# Patient Record
Sex: MALE | Race: AMERICAN INDIAN OR ALASKA NATIVE | NOT HISPANIC OR LATINO | Employment: FULL TIME | URBAN - METROPOLITAN AREA
[De-identification: names, ages, dates, MRNs, and addresses within clinical notes are randomized per-mention and may not be internally consistent; named-entity substitution may affect disease eponyms.]

---

## 2022-04-30 ENCOUNTER — OFFICE VISIT (OUTPATIENT)
Dept: URGENT CARE | Facility: PHYSICIAN GROUP | Age: 37
End: 2022-04-30
Payer: COMMERCIAL

## 2022-04-30 VITALS
RESPIRATION RATE: 14 BRPM | HEIGHT: 73 IN | DIASTOLIC BLOOD PRESSURE: 80 MMHG | TEMPERATURE: 98.4 F | OXYGEN SATURATION: 98 % | BODY MASS INDEX: 38.36 KG/M2 | WEIGHT: 289.4 LBS | HEART RATE: 90 BPM | SYSTOLIC BLOOD PRESSURE: 130 MMHG

## 2022-04-30 DIAGNOSIS — U07.1 COVID-19: ICD-10-CM

## 2022-04-30 DIAGNOSIS — R50.9 FEVER, UNSPECIFIED FEVER CAUSE: ICD-10-CM

## 2022-04-30 DIAGNOSIS — Z20.822 SUSPECTED COVID-19 VIRUS INFECTION: ICD-10-CM

## 2022-04-30 DIAGNOSIS — J02.9 SORE THROAT: ICD-10-CM

## 2022-04-30 DIAGNOSIS — R05.9 COUGH: ICD-10-CM

## 2022-04-30 PROCEDURE — 99203 OFFICE O/P NEW LOW 30 MIN: CPT | Mod: CS | Performed by: PHYSICIAN ASSISTANT

## 2022-04-30 NOTE — PROGRESS NOTES
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 At home test is positive.)     Symptoms started Wednesday with sore throat and congestion.  Thursday night fever and chills.  Sore throat increasing and myalgias.  At home test for covid positive this a.m. positive COVID contacts at home.  Mild fatigue.  No SOB.  Taking tylenol.  Recently diagnosed with DM and HTN.  Has been able to lose 30 pounds since and BP improving.  Is interested in antiviral therapy.    Medications:  hydrocodone-acetaminophen Tabs  ibuprofen Tabs    Allergies:             Penicillins    Surgical History:       No past surgical history on file.    Past Social Hx:  Gen Nicole  reports that he has quit smoking. His smoking use included cigarettes. He quit after 12.00 years of use. He has never used smokeless tobacco. He reports current alcohol use. He reports that he does not use drugs.     Past Family Hx:   Gen Nicole family history includes Diabetes in his father and mother; Hypertension in his mother; Stroke in his mother.       Problem list, medications, and allergies reviewed by myself today in Epic.     Objective:     /80 (BP Location: Left arm, Patient Position: Sitting)   Pulse 90   Temp 36.9 °C (98.4 °F)   Resp 14   Ht 1.854 m (6' 1\")   Wt (!) 131 kg (289 lb 6.4 oz)   SpO2 98%   BMI 38.18 kg/m²     Physical Exam  Vitals and nursing note reviewed.   Constitutional:       General: He is not in acute distress.     Appearance: Normal appearance.   HENT:      Head: Normocephalic.      Jaw: No trismus.      Right Ear: Tympanic membrane is not erythematous. Tympanic membrane has decreased mobility.      Left Ear: Tympanic membrane is not erythematous. Tympanic membrane has decreased mobility.      Nose: Congestion and rhinorrhea present.      Mouth/Throat:      Mouth: Mucous membranes are moist.      Palate: No lesions.      " Pharynx: Uvula midline. Posterior oropharyngeal erythema present. No oropharyngeal exudate.      Tonsils: No tonsillar exudate.   Eyes:      Extraocular Movements: Extraocular movements intact.   Cardiovascular:      Rate and Rhythm: Normal rate and regular rhythm.      Pulses: Normal pulses.      Heart sounds: Normal heart sounds.   Pulmonary:      Effort: Pulmonary effort is normal. No accessory muscle usage or respiratory distress.      Breath sounds: Normal breath sounds. No stridor or decreased air movement. No wheezing.   Abdominal:      Tenderness: There is no abdominal tenderness.   Musculoskeletal:      Cervical back: Normal range of motion.   Lymphadenopathy:      Cervical: No cervical adenopathy.   Skin:     Findings: No rash.   Neurological:      Mental Status: He is alert.   Psychiatric:         Behavior: Behavior is cooperative.         Assessment/Plan:     Diagnosis and Associated Orders:     1. Fever, unspecified fever cause    2. Sore throat    3. Cough    4. Suspected COVID-19 virus infection    5. COVID-19  - Nirmatrelvir & Ritonavir 20 x 150 MG & 10 x 100MG Tablet Therapy Pack; Take 300 mg nirmatrelvir (two 150 mg tablets) with 100 mg  ritonavir (one 100 mg tablet) by mouth, with all three tablets taken together  twice daily for 5 days.  Dispense: 30 Each; Refill: 0        Comments/MDM:    Patient's vital signs are reassuring and they appear hemodynamically stable and do not require higher level care at this time.  They are not hypoxic and have a normal pulmonary exam.  I discussed self isolation and ER precautions.  Patient should to proceed to ED for development of symptoms including but not limited to shortness of breath breath, respiratory distress, or worsening symptoms not manageable at home.  I instructed the patient to try to follow up with their PCP (if applicable) for follow up care  Symptomatic and supportive care:   Plenty of oral hydration and rest   Over the counter cough  "suppressant as directed.  Tylenol or ibuprofen for pain and fever as directed.   Warm salt water gargles for sore throat, soft foods, cool liquids.   Saline nasal spray and Flonase as a decongestant.   Infection control measures at home. Stay away from people, Hand washing, covering sneeze/cough, disinfect surfaces.   All questions were answered and patient demonstrated verbal understanding of above.  The patient will receive results via MyChart (\"detected\" is a positive result, \"not detected\" indicates a negative result) or in clinic with rapid test    Patient interested in paxlovid.  No obvious contraindications noted.  No renal dysfunction.  Prescription provided.  Encourage patient to speak with pharmacist.      I personally reviewed prior external notes and test results pertinent to today's visit.  Red flags discussed as well as indications to present to the Emergency Department.  Supportive care, natural history, differential diagnoses, and indications for immediate follow-up discussed.  Patient expresses understanding and agrees to plan.  Patient denies any other questions or concerns.    Follow-up with the primary care physician for recheck, reevaluation, and consideration of further management.      Please note that this dictation was created using voice recognition software. I have made a reasonable attempt to correct obvious errors, but I expect that there are errors of grammar and possibly content that I did not discover before finalizing the note.    This note was electronically signed by Trish Stuart PA-C          "

## 2023-08-24 ENCOUNTER — OFFICE VISIT (OUTPATIENT)
Dept: URGENT CARE | Facility: PHYSICIAN GROUP | Age: 38
End: 2023-08-24
Payer: COMMERCIAL

## 2023-08-24 VITALS
HEIGHT: 73 IN | RESPIRATION RATE: 16 BRPM | HEART RATE: 88 BPM | DIASTOLIC BLOOD PRESSURE: 82 MMHG | TEMPERATURE: 98.1 F | WEIGHT: 290 LBS | SYSTOLIC BLOOD PRESSURE: 120 MMHG | BODY MASS INDEX: 38.43 KG/M2 | OXYGEN SATURATION: 98 %

## 2023-08-24 DIAGNOSIS — U07.1 COVID-19: ICD-10-CM

## 2023-08-24 LAB
FLUAV RNA SPEC QL NAA+PROBE: NEGATIVE
FLUBV RNA SPEC QL NAA+PROBE: NEGATIVE
RSV RNA SPEC QL NAA+PROBE: NEGATIVE
SARS-COV-2 RNA RESP QL NAA+PROBE: POSITIVE

## 2023-08-24 PROCEDURE — 99213 OFFICE O/P EST LOW 20 MIN: CPT | Performed by: PHYSICIAN ASSISTANT

## 2023-08-24 PROCEDURE — 0241U POCT CEPHEID COV-2, FLU A/B, RSV - PCR: CPT | Performed by: PHYSICIAN ASSISTANT

## 2023-08-24 PROCEDURE — 3074F SYST BP LT 130 MM HG: CPT | Performed by: PHYSICIAN ASSISTANT

## 2023-08-24 PROCEDURE — 3079F DIAST BP 80-89 MM HG: CPT | Performed by: PHYSICIAN ASSISTANT

## 2023-08-24 ASSESSMENT — ENCOUNTER SYMPTOMS
CONSTIPATION: 0
SORE THROAT: 0
HEADACHES: 1
MYALGIAS: 1
COUGH: 0
ABDOMINAL PAIN: 0
NAUSEA: 0
DIARRHEA: 0
VOMITING: 0
SHORTNESS OF BREATH: 0
CHILLS: 1
EYE PAIN: 0
FEVER: 0

## 2023-08-24 NOTE — PROGRESS NOTES
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 male who presents for Coronavirus Screening (Requesting covid confirmation. Tested pos this morning. )      This is a pleasant 38-year-old male who had generalized malaise and fatigue starting yesterday, again felt significant body aches, brain fog and took a home COVID test which was positive.  He is not noted any dyspnea.  He has no history of asthma or COPD.  He denies any chest pain.  He notes symptoms are similar to previous COVID infection last year although not as severe    Review of Systems   Constitutional:  Positive for chills and malaise/fatigue. Negative for fever.   HENT:  Negative for congestion, ear pain and sore throat.    Eyes:  Negative for pain.   Respiratory:  Negative for cough and shortness of breath.    Cardiovascular:  Negative for chest pain.   Gastrointestinal:  Negative for abdominal pain, constipation, diarrhea, nausea and vomiting.   Genitourinary:  Negative for dysuria.   Musculoskeletal:  Positive for myalgias.   Skin:  Negative for rash.   Neurological:  Positive for headaches.       Medications, Allergies, and current problem list reviewed today in Epic.     Objective:     /82   Pulse 88   Temp 36.7 °C (98.1 °F) (Temporal)   Resp 16   Ht 1.854 m (6' 1\")   Wt (!) 132 kg (290 lb)   SpO2 98%     Physical Exam  Vitals reviewed.   Constitutional:       Appearance: Normal appearance.   HENT:      Head: Normocephalic and atraumatic.      Right Ear: External ear normal.      Left Ear: External ear normal.      Nose: Nose normal.      Mouth/Throat:      Mouth: Mucous membranes are moist.   Eyes:      Conjunctiva/sclera: Conjunctivae normal.   Cardiovascular:      Rate and Rhythm: Normal rate and regular rhythm.   Pulmonary:      Effort: Pulmonary effort is normal.      Breath sounds: Normal breath sounds.   Skin:     General: Skin is warm and dry.      Capillary Refill: Capillary refill takes less than 2 seconds.   Neurological:      " Mental Status: He is alert and oriented to person, place, and time.         Assessment/Plan:     Diagnosis and associated orders:     1. COVID-19  POCT CoV-2, Flu A/B, RSV by PCR    Nirmatrelvir&Ritonavir 300/100 20 x 150 MG & 10 x 100MG Tablet Therapy Pack         Comments/MDM:     I have reviewed the patient's chart and discussed current prescription and over-the-counter medication usage.  To the best my knowledge there is no evidence of decreased kidney function nor any medication usage which would adversely interact with Paxlovid antiviral therapy.  I discussed the risk versus benefits with the patient and they have agreed to trial this medication with full knowledge of its emergency use authorization.  Patient currently with mild to moderate symptoms, no evidence of hypoxia or endorgan dysfunction.  At this point the patient seems to be suffering from viral cough Covid without any evidence of hypoxia or more serious Covid pneumonia.  I educated the patient on isolation for the full 10 days, indications for ER presentation, supportive care.  Patient demonstrated understanding of instructions.  Documentation was provided for work if needed.  Patient encouraged to be aware of symptomatology especially worsening respiratory status that would prompt emergent evaluation.           Differential diagnosis, natural history, supportive care, and indications for immediate follow-up discussed.    Advised the patient to follow-up with the primary care physician for recheck, reevaluation, and consideration of further management.    Please note that this dictation was created using voice recognition software. I have made a reasonable attempt to correct obvious errors, but I expect that there are errors of grammar and possibly content that I did not discover before finalizing the note.    This note was electronically signed by Narciso Mancera PA-C

## 2023-08-24 NOTE — LETTER
August 24, 2023    To Whom It May Concern:         This is confirmation that Gen Nicole attended his scheduled appointment with Narciso Mancera P.A.-C. on 8/24/23.  Your employee was seen in our clinic today and had a POSITIVE Covid-19 test.    Since the results of testing are positive, your employee should follow the CDC guidelines.  These are isolation for a minimum of 5 days and at least 24 hours have passed since your last fever without the use of fever-reducing medications and all other symptoms have improved.  After completing the isolation the patient must continue to use a well fitting mask for an additional 5 days.  The health department may contact you and provide further directions regarding self-isolation and return to work.     This is the only note that will be provided from [facility] for this visit.  Your employee will require an appointment with a primary care provider if FMLA or disability forms are required.  If you have any questions please do not hesitate to call me at the phone number listed below.    Sincerely,    Narciso Mancera P.A.-C.  [phone]

## 2024-05-05 ENCOUNTER — HOSPITAL ENCOUNTER (OUTPATIENT)
Facility: MEDICAL CENTER | Age: 39
End: 2024-05-05
Attending: FAMILY MEDICINE
Payer: COMMERCIAL

## 2024-05-05 ENCOUNTER — OFFICE VISIT (OUTPATIENT)
Dept: URGENT CARE | Facility: PHYSICIAN GROUP | Age: 39
End: 2024-05-05
Payer: COMMERCIAL

## 2024-05-05 VITALS
RESPIRATION RATE: 18 BRPM | WEIGHT: 307 LBS | SYSTOLIC BLOOD PRESSURE: 124 MMHG | BODY MASS INDEX: 40.69 KG/M2 | HEART RATE: 97 BPM | HEIGHT: 73 IN | OXYGEN SATURATION: 95 % | DIASTOLIC BLOOD PRESSURE: 80 MMHG | TEMPERATURE: 98.5 F

## 2024-05-05 DIAGNOSIS — Z11.3 SCREEN FOR STD (SEXUALLY TRANSMITTED DISEASE): ICD-10-CM

## 2024-05-05 DIAGNOSIS — H60.392 OTHER INFECTIVE ACUTE OTITIS EXTERNA OF LEFT EAR: ICD-10-CM

## 2024-05-05 LAB — AMBIGUOUS DTTM AMBI4: NORMAL

## 2024-05-05 PROCEDURE — 3079F DIAST BP 80-89 MM HG: CPT | Performed by: FAMILY MEDICINE

## 2024-05-05 PROCEDURE — 3074F SYST BP LT 130 MM HG: CPT | Performed by: FAMILY MEDICINE

## 2024-05-05 PROCEDURE — 99214 OFFICE O/P EST MOD 30 MIN: CPT | Performed by: FAMILY MEDICINE

## 2024-05-05 PROCEDURE — 87651 STREP A DNA AMP PROBE: CPT | Performed by: FAMILY MEDICINE

## 2024-05-05 RX ORDER — LEVOFLOXACIN 500 MG/1
500 TABLET, FILM COATED ORAL DAILY
Qty: 7 TABLET | Refills: 0 | Status: SHIPPED | OUTPATIENT
Start: 2024-05-05 | End: 2024-05-12

## 2024-05-05 NOTE — PROGRESS NOTES
"Subjective:      CC: Otalgia -  ear pain            Otalgia -  left  ear  This is a new problem. The current episode started yesterday. The problem occurs constantly. The problem has been unchanged. Associated symptoms include: sore throat, subj fever.         Pertinent negatives include no abdominal pain, chest pain, chills, fever, headaches, joint swelling, myalgias, nausea, neck pain, rash or visual change. Nothing aggravates the symptoms. She has tried nothing for the symptoms.        #2.  STI screening:   routine.   Pt is asymptomatic      Review of Systems   Constitutional: +  for fever    HENT:   Negative for hearing loss and tinnitus.    Respiratory: no cough. Negative for hemoptysis, shortness of breath and wheezing.    Cardiovascular: Negative for chest pain, palpitations and leg swelling.   Gastrointestinal: Negative for nausea and abdominal pain.   Musculoskeletal: Negative for myalgias, joint swelling and neck pain.   Skin: Negative for rash.   Neurological: Negative for headaches.   All other systems reviewed and are negative.         Objective:     /80 (BP Location: Right arm, Patient Position: Sitting, BP Cuff Size: Adult long)   Pulse 97   Temp 36.9 °C (98.5 °F) (Temporal)   Resp 18   Ht 1.854 m (6' 1\")   Wt (!) 139 kg (307 lb)   SpO2 95%       Physical Exam   Constitutional: Vital signs are normal.   No distress.   HENT:   Head: There is normal jaw occlusion.   Left  Ear:  There is pain upon palpation of the tragus.  There is erythema, edema, and narrowing of the external auditory canal.   Slight serous discharge noted     Nose:   No nasal discharge.   Mouth/Throat: Mucous membranes are moist. No oral lesions.  No erythema. No oropharyngeal exudate, pharynx swelling or pharynx petechiae. No  exudate.   Eyes: Conjunctivae and EOM are normal. Pupils are equal, round, and reactive to light. Right eye exhibits no discharge. Left eye exhibits no discharge.   Neck: Normal range of motion. " Neck supple.  No adenopathy  Cardiovascular: Normal rate and regular rhythm.  Pulses are palpable.    No murmur heard.  Pulmonary/Chest: Effort normal and breath sounds normal. There is normal air entry. No respiratory distress. no wheezes, rhonchi,  retraction.   Musculoskeletal:   no edema.   Neurological: A/O x 3.   CN 2-12 intact   Skin: Skin is warm. Capillary refill takes less than 3 seconds. No purpura and no rash noted. Patient is not diaphoretic. No jaundice or pallor.   Nursing note and vitals reviewed.              Assessment/Plan:      1. Other infective acute otitis externa of left ear   PCN-allergic    - levoFLOXacin (LEVAQUIN) 500 MG tablet; Take 1 Tablet by mouth every day for 7 days.  Dispense: 7 Tablet; Refill: 0    2. Screen for STD (sexually transmitted disease)     - Chlamydia/GC, PCR (Urine); Future  - HIV-1/HIV-2 SINGLE RESULT; Future  - RPR (SYPHILIS); Future      Differential diagnosis, natural history, supportive care, and indications for immediate follow-up discussed. All questions answered. Patient agrees with the plan of care.     Follow-up as needed if symptoms worsen or fail to improve to PCP, Urgent care or Emergency Room.     I have personally reviewed prior external notes and test results pertinent to today's visit.  I have independently reviewed and interpreted all diagnostics ordered during this urgent care acute visit.

## 2024-05-06 ENCOUNTER — HOSPITAL ENCOUNTER (OUTPATIENT)
Dept: LAB | Facility: MEDICAL CENTER | Age: 39
End: 2024-05-06
Attending: FAMILY MEDICINE
Payer: COMMERCIAL

## 2024-05-06 DIAGNOSIS — Z11.3 SCREEN FOR STD (SEXUALLY TRANSMITTED DISEASE): ICD-10-CM

## 2024-05-06 DIAGNOSIS — J02.9 SORE THROAT: ICD-10-CM

## 2024-05-06 LAB
C TRACH DNA SPEC QL NAA+PROBE: NEGATIVE
HIV 1+2 AB+HIV1 P24 AG SERPL QL IA: NORMAL
N GONORRHOEA DNA SPEC QL NAA+PROBE: NEGATIVE
S PYO DNA SPEC NAA+PROBE: NOT DETECTED
SPECIMEN SOURCE: NORMAL
T PALLIDUM AB SER QL IA: NORMAL

## 2024-12-17 ENCOUNTER — APPOINTMENT (OUTPATIENT)
Dept: MEDICAL GROUP | Age: 39
End: 2024-12-17
Payer: COMMERCIAL